# Patient Record
Sex: MALE | Race: WHITE | NOT HISPANIC OR LATINO
[De-identification: names, ages, dates, MRNs, and addresses within clinical notes are randomized per-mention and may not be internally consistent; named-entity substitution may affect disease eponyms.]

---

## 2020-07-12 ENCOUNTER — TRANSCRIPTION ENCOUNTER (OUTPATIENT)
Age: 35
End: 2020-07-12

## 2020-07-13 ENCOUNTER — APPOINTMENT (OUTPATIENT)
Dept: ORTHOPEDIC SURGERY | Facility: CLINIC | Age: 35
End: 2020-07-13
Payer: COMMERCIAL

## 2020-07-13 VITALS
WEIGHT: 255 LBS | OXYGEN SATURATION: 96 % | HEIGHT: 70 IN | SYSTOLIC BLOOD PRESSURE: 127 MMHG | HEART RATE: 59 BPM | DIASTOLIC BLOOD PRESSURE: 78 MMHG | BODY MASS INDEX: 36.51 KG/M2 | TEMPERATURE: 98 F

## 2020-07-13 PROBLEM — Z00.00 ENCOUNTER FOR PREVENTIVE HEALTH EXAMINATION: Status: ACTIVE | Noted: 2020-07-13

## 2020-07-13 PROCEDURE — 99203 OFFICE O/P NEW LOW 30 MIN: CPT

## 2020-07-13 PROCEDURE — 73080 X-RAY EXAM OF ELBOW: CPT | Mod: LT

## 2020-07-13 NOTE — PHYSICAL EXAM
[FreeTextEntry1] : General: well nourished, in no acute distress, alert and oriented to person, place and time.\par Psychiatric: normal mood and affect, no abnormal movements or speech patterns.\par Eyes: vision intact without deficits, sclera and conjunctiva were normal, pupils were equal in size. \par ENT: Ears and nose were normal in appearance. No thyromegaly.\par Lymph: no enlarged nodes, no lymphedema in extremity.\par Respiratory: Normal respiratory rhythm and effort. No wheezing detected without auscultation. No shortness of breath or respiratory distress.\par Cardiac: no cardiac related leg swelling, 2+ peripheral pulses.\par Neurology: normal gross sensation in extremities to light touch.\par Abdomen: soft, non-tender, tympanic, no masses.\par \par LUE:\par \par Skin CDI. No swelling or ecchymosis.\par M/U/R/AIN/PIN 5/5. M/U/R/Ax SILT. +2 Rad pulse. Compartments soft. \par No TTP over radial head area with deep palpation.\par Limited supination/pronation d/t pain. \par

## 2020-07-13 NOTE — HISTORY OF PRESENT ILLNESS
[FreeTextEntry1] : This is a 34-year-old male with no past medical history, RHD, who is presenting for evaluation of left elbow pain.  He slipped and fell directly on his elbow 2 days ago on 7/11/2020.  He subsequently had sharp elbow pain and was seen at an urgent care where x-rays revealed a minimally displaced left radial head fracture.  He was placed in a posterior splint and sent home.  Today he says his pain is 5 out of 10 in severity localized to his left elbow without radiation.  He has taken Advil and Tylenol as needed.  He denies any fevers or constitutional symptoms.  He works as an .

## 2020-07-13 NOTE — DISCUSSION/SUMMARY
[de-identified] : This is a 34-year-old male with a minimally displaced left radial head fracture.  The treatment is borderline nonoperative versus operative, and will depend on the stability of the fracture.  I recommended the use of a sling for the next week with limitations on elbow range of motion and pronation supination.  We will obtain new x-rays next week to assess stability.  If there is no interval change I will recommend discontinuing the sling and initiating light elbow range of motion exercises.  I will then see him 1 week after that to assess again for stability.  If there is any change during this time I will indicate him for ORIF of the left radial head.  If the fracture remains stable we will continue nonoperative management.  I have explained all the risks and benefits of both treatment options.  He may continue pain control as needed.  This plan was discussed in detail with the patient who was in full agreement. All questions were answered.

## 2020-07-13 NOTE — DATA REVIEWED
[de-identified] : 7/13/2020 left elbow x-rays (radial head, AP, oblique, lateral): There is a minimally displaced left radial head fracture, roughly 2 mm displaced.  There is no significant change from prior x-rays performed yesterday at the urgent care.

## 2020-07-20 ENCOUNTER — APPOINTMENT (OUTPATIENT)
Dept: ORTHOPEDIC SURGERY | Facility: CLINIC | Age: 35
End: 2020-07-20
Payer: COMMERCIAL

## 2020-07-20 VITALS
DIASTOLIC BLOOD PRESSURE: 87 MMHG | OXYGEN SATURATION: 97 % | SYSTOLIC BLOOD PRESSURE: 131 MMHG | HEART RATE: 70 BPM | TEMPERATURE: 98.1 F | HEIGHT: 70 IN | BODY MASS INDEX: 36.51 KG/M2 | WEIGHT: 255 LBS

## 2020-07-20 PROCEDURE — 73080 X-RAY EXAM OF ELBOW: CPT | Mod: LT

## 2020-07-20 PROCEDURE — 99213 OFFICE O/P EST LOW 20 MIN: CPT

## 2020-07-20 NOTE — HISTORY OF PRESENT ILLNESS
[FreeTextEntry1] : This is a RHD 34-year-old male who sustained a left radial head fracture 2 weeks ago.  He was treated in a sling with limitations on range of motion and supination and pronation.  He says his pain has dramatically improved during this time by approximately 70%.  He is now occasionally taking anti-inflammatories.  He does not have pain at rest.  He is here today for follow-up x-rays to assess for fracture stability.

## 2020-07-20 NOTE — DATA REVIEWED
[de-identified] : 7/20/2020 - Left elbow (AP, lateral, oblique, radial head): Minimally displaced radial head fracture is again seen, unchanged in position and alignment from prior x-rays.

## 2020-07-20 NOTE — DISCUSSION/SUMMARY
[de-identified] : This is a 34-year-old male with a minimally displaced left radial head fracture, now stable 2 weeks post injury.  He may now discontinue the sling and initiate gentle elbow range of motion exercises, avoiding supination and pronation.  I like to see him again next week for repeat x-rays to assess for stability.  I have explained that if this fracture shifts he may require surgical intervention.  Otherwise I would see him 3 weeks after the next visit for repeat x-rays and will most likely start physical therapy at that time which would represent 6 weeks from the date of injury.  This plan was discussed in detail with the patient who was in full agreement. All questions were answered.

## 2020-07-20 NOTE — PHYSICAL EXAM
[FreeTextEntry1] : General: well nourished, in no acute distress, alert and oriented to person, place and time.\par Psychiatric: normal mood and affect, no abnormal movements or speech patterns.\par Eyes: vision intact without deficits, sclera and conjunctiva were normal, pupils were equal in size. \par ENT: Ears and nose were normal in appearance. No thyromegaly.\par Lymph: no enlarged nodes, no lymphedema in extremity.\par Respiratory: Normal respiratory rhythm and effort. No wheezing detected without auscultation. No shortness of breath or respiratory distress.\par Cardiac: no cardiac related leg swelling, 2+ peripheral pulses.\par Neurology: normal gross sensation in extremities to light touch.\par Abdomen: soft, non-tender, tympanic, no masses.\par \par LUE:\par \par Skin CDI. No swelling or ecchymosis.\par M/U/R/AIN/PIN 5/5. M/U/R/Ax SILT. +2 Rad pulse. Compartments soft. \par No TTP over radial head area with deep palpation.\par Limited supination/pronation d/t pain. \par  \par \par

## 2020-07-27 ENCOUNTER — APPOINTMENT (OUTPATIENT)
Dept: ORTHOPEDIC SURGERY | Facility: CLINIC | Age: 35
End: 2020-07-27
Payer: COMMERCIAL

## 2020-07-27 VITALS
DIASTOLIC BLOOD PRESSURE: 84 MMHG | BODY MASS INDEX: 36.51 KG/M2 | SYSTOLIC BLOOD PRESSURE: 146 MMHG | HEIGHT: 70 IN | WEIGHT: 255 LBS | HEART RATE: 62 BPM

## 2020-07-27 VITALS — TEMPERATURE: 98.2 F

## 2020-07-27 PROCEDURE — 99214 OFFICE O/P EST MOD 30 MIN: CPT

## 2020-07-27 PROCEDURE — 73080 X-RAY EXAM OF ELBOW: CPT | Mod: LT

## 2020-08-13 ENCOUNTER — APPOINTMENT (OUTPATIENT)
Dept: ORTHOPEDIC SURGERY | Facility: CLINIC | Age: 35
End: 2020-08-13
Payer: COMMERCIAL

## 2020-08-13 VITALS
HEART RATE: 69 BPM | WEIGHT: 255 LBS | SYSTOLIC BLOOD PRESSURE: 116 MMHG | HEIGHT: 70 IN | BODY MASS INDEX: 36.51 KG/M2 | DIASTOLIC BLOOD PRESSURE: 77 MMHG | OXYGEN SATURATION: 97 %

## 2020-08-13 PROCEDURE — 99213 OFFICE O/P EST LOW 20 MIN: CPT

## 2020-08-13 PROCEDURE — 73080 X-RAY EXAM OF ELBOW: CPT | Mod: LT

## 2020-08-14 NOTE — DATA REVIEWED
[de-identified] : 8/14/2020–left elbow x-rays (AP, lateral, oblique, radial head view): Radial head fracture is again visualized, unchanged in alignment from prior.  The fracture gap remains without evidence of instability.

## 2020-08-14 NOTE — DISCUSSION/SUMMARY
[de-identified] : This is a 34M, now nearly 5 weeks s/p L minimally displaced radial head fx. His fracture remains stable and his symptoms continue to improve. He currently has full and painless supination/pronation without deficits. I have recommended continued conservative management. He may initiate light PT. FU in 3 weeks for repeat xrays to assess for any changes, at which point I may advance his level of activity. This plan was discussed in detail with the patient who was in full agreement. All questions were answered.

## 2020-08-14 NOTE — PHYSICAL EXAM
[FreeTextEntry1] : General: well nourished, in no acute distress, alert and oriented to person, place and time.\par Psychiatric: normal mood and affect, no abnormal movements or speech patterns.\par Eyes: vision intact without deficits, sclera and conjunctiva were normal, pupils were equal in size. \par ENT: Ears and nose were normal in appearance. No thyromegaly.\par Lymph: no enlarged nodes, no lymphedema in extremity.\par Respiratory: Normal respiratory rhythm and effort. No wheezing detected without auscultation. No shortness of breath or respiratory distress.\par Cardiac: no cardiac related leg swelling, 2+ peripheral pulses.\par Neurology: normal gross sensation in extremities to light touch.\par Abdomen: soft, non-tender, tympanic, no masses.\par  \par \par LUE:\par \par Skin CDI. No swelling or ecchymosis.\par M/U/R/AIN/PIN 5/5. M/U/R/Ax SILT. +2 Rad pulse. Compartments soft. \par No TTP over radial head area with deep palpation.\par Full supination/pronation as compared to contralateral side without pain. \par Elbow ROM: 5-130 without pain. \par  \par

## 2020-08-14 NOTE — HISTORY OF PRESENT ILLNESS
[FreeTextEntry1] : This is a RHD 34-year-old male who sustained a left radial head fracture 4.5 weeks ago. He was treated in a sling with limitations on range of motion and supination and pronation. Was last seen 2 weeks ago, on 7/27/20 at which point light ROM and PT were initiated. Today he is here for xrays to confirm stability of the fracture.  He says he has been doing very well during this time and has had near complete resolution of pain.  He has not yet been to physical therapy but has been performing light elbow flexion extension and supination pronation motions.  He has not required any pain medication.

## 2020-09-08 ENCOUNTER — APPOINTMENT (OUTPATIENT)
Dept: ORTHOPEDIC SURGERY | Facility: CLINIC | Age: 35
End: 2020-09-08
Payer: COMMERCIAL

## 2020-09-08 DIAGNOSIS — S52.125A NONDISPLACED FRACTURE OF HEAD OF LEFT RADIUS, INITIAL ENCOUNTER FOR CLOSED FRACTURE: ICD-10-CM

## 2020-09-08 PROCEDURE — 99213 OFFICE O/P EST LOW 20 MIN: CPT

## 2020-09-08 PROCEDURE — 73080 X-RAY EXAM OF ELBOW: CPT | Mod: LT

## 2020-09-09 PROBLEM — S52.125A CLOSED NONDISPLACED FRACTURE OF HEAD OF LEFT RADIUS, INITIAL ENCOUNTER: Status: ACTIVE | Noted: 2020-07-13

## 2020-09-09 NOTE — PHYSICAL EXAM
[FreeTextEntry1] : General: well nourished, in no acute distress, alert and oriented to person, place and time.\par Psychiatric: normal mood and affect, no abnormal movements or speech patterns.\par Eyes: vision intact without deficits, sclera and conjunctiva were normal, pupils were equal in size. \par ENT: Ears and nose were normal in appearance. No thyromegaly.\par Lymph: no enlarged nodes, no lymphedema in extremity.\par Respiratory: Normal respiratory rhythm and effort. No wheezing detected without auscultation. No shortness of breath or respiratory distress.\par Cardiac: no cardiac related leg swelling, 2+ peripheral pulses.\par Neurology: normal gross sensation in extremities to light touch.\par Abdomen: soft, non-tender, tympanic, no masses.\par  \par \par LUE:\par \par Skin CDI. No swelling or ecchymosis.\par M/U/R/AIN/PIN 5/5. M/U/R/Ax SILT. +2 Rad pulse. Compartments soft. \par No TTP over radial head area with deep palpation.\par Full supination/pronation as compared to contralateral side without pain. \par Elbow ROM: 5-130 without pain. \par  \par  \par \par  \par

## 2020-09-09 NOTE — DATA REVIEWED
[de-identified] : 9/8/2020–left elbow x-rays (AP, lateral, oblique, radial head): No interval displacement of the known radial head fracture.  There is interval opacification at the fracture site consistent with healing.

## 2020-09-09 NOTE — HISTORY OF PRESENT ILLNESS
[FreeTextEntry1] : This is a RHD 34-year-old male who sustained a left radial head fracture 8.5 weeks ago. He was treated initially in a sling followed by gentle advancements in ROM. He says he has been doing very well during this time and currently denies any pain, except when performing torque like activities. He has not required any pain medication. \par

## 2020-09-09 NOTE — DISCUSSION/SUMMARY
[de-identified] : This is a 34-year-old male, now 7-1/2 weeks status post left radial head fracture, healing appropriately with nonoperative management.  He has near complete resolution of symptoms however he does have a loss of 5 degrees of terminal elbow extension.  I have therefore recommended physical therapy to work on range of motion.  He may now be weightbearing as tolerated without any restrictions.  May follow-up on a as needed basis.  The patient asked about goal activities which I recommended avoiding intensive torquing for the next 4 weeks to allow for more bony healing.

## 2021-03-05 ENCOUNTER — TRANSCRIPTION ENCOUNTER (OUTPATIENT)
Age: 36
End: 2021-03-05

## 2022-03-14 ENCOUNTER — OUTPATIENT (OUTPATIENT)
Dept: OUTPATIENT SERVICES | Facility: HOSPITAL | Age: 37
LOS: 1 days | End: 2022-03-14
Payer: COMMERCIAL

## 2022-03-14 DIAGNOSIS — Z20.828 CONTACT WITH AND (SUSPECTED) EXPOSURE TO OTHER VIRAL COMMUNICABLE DISEASES: ICD-10-CM

## 2022-03-14 LAB — SARS-COV-2 RNA SPEC QL NAA+PROBE: SIGNIFICANT CHANGE UP

## 2022-03-14 PROCEDURE — U0003: CPT

## 2022-03-14 PROCEDURE — U0005: CPT

## 2025-03-19 ENCOUNTER — NON-APPOINTMENT (OUTPATIENT)
Age: 40
End: 2025-03-19

## 2025-03-19 ENCOUNTER — APPOINTMENT (OUTPATIENT)
Dept: ORTHOPEDIC SURGERY | Facility: CLINIC | Age: 40
End: 2025-03-19
Payer: COMMERCIAL

## 2025-03-19 DIAGNOSIS — Q74.2 OTHER CONGENITAL MALFORMATIONS OF LOWER LIMB(S), INCLUDING PELVIC GIRDLE: ICD-10-CM

## 2025-03-19 DIAGNOSIS — M79.672 PAIN IN LEFT FOOT: ICD-10-CM

## 2025-03-19 DIAGNOSIS — M79.671 PAIN IN RIGHT FOOT: ICD-10-CM

## 2025-03-19 PROCEDURE — 99204 OFFICE O/P NEW MOD 45 MIN: CPT

## 2025-03-19 PROCEDURE — 73630 X-RAY EXAM OF FOOT: CPT | Mod: 50

## 2025-04-16 ENCOUNTER — APPOINTMENT (OUTPATIENT)
Dept: ORTHOPEDIC SURGERY | Facility: CLINIC | Age: 40
End: 2025-04-16
Payer: COMMERCIAL

## 2025-04-16 DIAGNOSIS — Q74.2 OTHER CONGENITAL MALFORMATIONS OF LOWER LIMB(S), INCLUDING PELVIC GIRDLE: ICD-10-CM

## 2025-04-16 DIAGNOSIS — M79.671 PAIN IN RIGHT FOOT: ICD-10-CM

## 2025-04-16 DIAGNOSIS — M84.30XA STRESS FRACTURE, UNSPECIFIED SITE, INITIAL ENCOUNTER FOR FRACTURE: ICD-10-CM

## 2025-04-16 PROCEDURE — 99214 OFFICE O/P EST MOD 30 MIN: CPT

## 2025-04-16 RX ORDER — METHYLPREDNISOLONE 4 MG/1
4 TABLET ORAL
Qty: 1 | Refills: 0 | Status: ACTIVE | COMMUNITY
Start: 2025-04-16 | End: 1900-01-01

## 2025-04-16 RX ORDER — MELOXICAM 15 MG/1
15 TABLET ORAL
Qty: 30 | Refills: 1 | Status: ACTIVE | COMMUNITY
Start: 2025-04-16 | End: 1900-01-01